# Patient Record
Sex: FEMALE | Race: WHITE | URBAN - METROPOLITAN AREA
[De-identification: names, ages, dates, MRNs, and addresses within clinical notes are randomized per-mention and may not be internally consistent; named-entity substitution may affect disease eponyms.]

---

## 2018-01-01 ENCOUNTER — APPOINTMENT (OUTPATIENT)
Dept: GENERAL RADIOLOGY | Facility: CLINIC | Age: 78
End: 2018-01-01
Attending: EMERGENCY MEDICINE
Payer: MEDICARE

## 2018-01-01 ENCOUNTER — NURSING HOME VISIT (OUTPATIENT)
Dept: GERIATRICS | Facility: CLINIC | Age: 78
End: 2018-01-01
Payer: MEDICARE

## 2018-01-01 ENCOUNTER — HOME CARE/HOSPICE - HEALTHEAST (OUTPATIENT)
Dept: HOSPICE | Facility: HOSPICE | Age: 78
End: 2018-01-01

## 2018-01-01 ENCOUNTER — RECORDS - HEALTHEAST (OUTPATIENT)
Dept: LAB | Facility: CLINIC | Age: 78
End: 2018-01-01

## 2018-01-01 ENCOUNTER — MEDICAL CORRESPONDENCE (OUTPATIENT)
Dept: HEALTH INFORMATION MANAGEMENT | Facility: CLINIC | Age: 78
End: 2018-01-01

## 2018-01-01 ENCOUNTER — HOSPITAL LABORATORY (OUTPATIENT)
Facility: OTHER | Age: 78
End: 2018-01-01

## 2018-01-01 ENCOUNTER — HOSPITAL ENCOUNTER (EMERGENCY)
Facility: CLINIC | Age: 78
Discharge: OTHER INSTITUTION | End: 2018-05-23
Attending: PHYSICIAN ASSISTANT | Admitting: PHYSICIAN ASSISTANT
Payer: MEDICARE

## 2018-01-01 ENCOUNTER — TELEPHONE (OUTPATIENT)
Dept: CARE COORDINATION | Facility: CLINIC | Age: 78
End: 2018-01-01

## 2018-01-01 VITALS
DIASTOLIC BLOOD PRESSURE: 86 MMHG | RESPIRATION RATE: 25 BRPM | TEMPERATURE: 98.3 F | HEART RATE: 133 BPM | OXYGEN SATURATION: 98 % | SYSTOLIC BLOOD PRESSURE: 169 MMHG

## 2018-01-01 VITALS
TEMPERATURE: 98.8 F | WEIGHT: 131.4 LBS | SYSTOLIC BLOOD PRESSURE: 167 MMHG | RESPIRATION RATE: 16 BRPM | DIASTOLIC BLOOD PRESSURE: 78 MMHG | OXYGEN SATURATION: 95 % | HEART RATE: 85 BPM

## 2018-01-01 VITALS
WEIGHT: 125 LBS | TEMPERATURE: 99.4 F | HEART RATE: 98 BPM | DIASTOLIC BLOOD PRESSURE: 67 MMHG | SYSTOLIC BLOOD PRESSURE: 137 MMHG | RESPIRATION RATE: 18 BRPM | OXYGEN SATURATION: 96 %

## 2018-01-01 VITALS
OXYGEN SATURATION: 96 % | RESPIRATION RATE: 16 BRPM | SYSTOLIC BLOOD PRESSURE: 129 MMHG | HEART RATE: 96 BPM | WEIGHT: 134.4 LBS | TEMPERATURE: 99.1 F | DIASTOLIC BLOOD PRESSURE: 53 MMHG

## 2018-01-01 DIAGNOSIS — I48.91 ATRIAL FIBRILLATION WITH RAPID VENTRICULAR RESPONSE (H): ICD-10-CM

## 2018-01-01 DIAGNOSIS — D64.9 ANEMIA, UNSPECIFIED TYPE: ICD-10-CM

## 2018-01-01 DIAGNOSIS — N18.6 END STAGE RENAL DISEASE (H): Primary | ICD-10-CM

## 2018-01-01 DIAGNOSIS — I48.91 ATRIAL FIBRILLATION WITH RAPID VENTRICULAR RESPONSE (H): Primary | ICD-10-CM

## 2018-01-01 DIAGNOSIS — I10 ESSENTIAL HYPERTENSION: ICD-10-CM

## 2018-01-01 DIAGNOSIS — I50.9 ACUTE ON CHRONIC CONGESTIVE HEART FAILURE, UNSPECIFIED CONGESTIVE HEART FAILURE TYPE: ICD-10-CM

## 2018-01-01 DIAGNOSIS — J18.9 COMMUNITY ACQUIRED PNEUMONIA OF LEFT LOWER LOBE OF LUNG: ICD-10-CM

## 2018-01-01 DIAGNOSIS — I50.9 CONGESTIVE HEART FAILURE, UNSPECIFIED CONGESTIVE HEART FAILURE CHRONICITY, UNSPECIFIED CONGESTIVE HEART FAILURE TYPE: ICD-10-CM

## 2018-01-01 DIAGNOSIS — N18.6 END STAGE RENAL DISEASE (H): ICD-10-CM

## 2018-01-01 DIAGNOSIS — F03.90 DEMENTIA WITHOUT BEHAVIORAL DISTURBANCE, UNSPECIFIED DEMENTIA TYPE: ICD-10-CM

## 2018-01-01 DIAGNOSIS — T74.91XD: ICD-10-CM

## 2018-01-01 DIAGNOSIS — R06.09 DYSPNEA ON EXERTION: ICD-10-CM

## 2018-01-01 DIAGNOSIS — D50.9 IRON DEFICIENCY ANEMIA, UNSPECIFIED IRON DEFICIENCY ANEMIA TYPE: ICD-10-CM

## 2018-01-01 DIAGNOSIS — I25.119 CORONARY ARTERY DISEASE INVOLVING NATIVE HEART WITH ANGINA PECTORIS, UNSPECIFIED VESSEL OR LESION TYPE (H): ICD-10-CM

## 2018-01-01 DIAGNOSIS — N18.30 CKD (CHRONIC KIDNEY DISEASE) STAGE 3, GFR 30-59 ML/MIN (H): ICD-10-CM

## 2018-01-01 DIAGNOSIS — D63.8 ANEMIA OF CHRONIC DISEASE: ICD-10-CM

## 2018-01-01 LAB
ABO + RH BLD: NORMAL
ABO + RH BLD: NORMAL
ALBUMIN SERPL-MCNC: 2.3 G/DL (ref 3.4–5)
ALBUMIN UR-MCNC: 30 MG/DL
ALP SERPL-CCNC: 87 U/L (ref 40–150)
ALT SERPL W P-5'-P-CCNC: 17 U/L (ref 0–50)
ANION GAP SERPL CALCULATED.3IONS-SCNC: 10 MMOL/L (ref 3–14)
ANION GAP SERPL CALCULATED.3IONS-SCNC: 7 MMOL/L (ref 3–14)
ANION GAP SERPL CALCULATED.3IONS-SCNC: 7 MMOL/L (ref 5–18)
ANION GAP SERPL CALCULATED.3IONS-SCNC: 8 MMOL/L (ref 3–14)
APPEARANCE UR: ABNORMAL
APTT PPP: 46 SEC (ref 22–37)
AST SERPL W P-5'-P-CCNC: 16 U/L (ref 0–45)
BACTERIA #/AREA URNS HPF: ABNORMAL /HPF
BASOPHILS # BLD AUTO: 0 10E9/L (ref 0–0.2)
BASOPHILS NFR BLD AUTO: 0.2 %
BILIRUB SERPL-MCNC: 0.3 MG/DL (ref 0.2–1.3)
BILIRUB UR QL STRIP: NEGATIVE
BLD GP AB SCN SERPL QL: NORMAL
BLD PROD TYP BPU: NORMAL
BLD PROD TYP BPU: NORMAL
BLD UNIT ID BPU: 0
BLOOD BANK CMNT PATIENT-IMP: NORMAL
BLOOD PRODUCT CODE: NORMAL
BPU ID: NORMAL
BUN SERPL-MCNC: 26 MG/DL (ref 8–28)
BUN SERPL-MCNC: 45 MG/DL (ref 7–30)
BUN SERPL-MCNC: 56 MG/DL (ref 7–30)
BUN SERPL-MCNC: 75 MG/DL (ref 7–30)
CALCIUM SERPL-MCNC: 8.6 MG/DL (ref 8.5–10.1)
CALCIUM SERPL-MCNC: 8.9 MG/DL (ref 8.5–10.1)
CALCIUM SERPL-MCNC: 9.3 MG/DL (ref 8.5–10.1)
CALCIUM SERPL-MCNC: 9.9 MG/DL (ref 8.5–10.5)
CHLORIDE BLD-SCNC: 108 MMOL/L (ref 98–107)
CHLORIDE SERPL-SCNC: 105 MMOL/L (ref 94–109)
CHLORIDE SERPL-SCNC: 105 MMOL/L (ref 94–109)
CHLORIDE SERPL-SCNC: 107 MMOL/L (ref 94–109)
CO2 SERPL-SCNC: 21 MMOL/L (ref 20–32)
CO2 SERPL-SCNC: 23 MMOL/L (ref 20–32)
CO2 SERPL-SCNC: 24 MMOL/L (ref 20–32)
CO2 SERPL-SCNC: 26 MMOL/L (ref 22–31)
COLOR UR AUTO: YELLOW
CREAT SERPL-MCNC: 1.62 MG/DL (ref 0.6–1.1)
CREAT SERPL-MCNC: 3.69 MG/DL (ref 0.52–1.04)
CREAT SERPL-MCNC: 4.73 MG/DL (ref 0.52–1.04)
CREAT SERPL-MCNC: 5.87 MG/DL (ref 0.52–1.04)
DIFFERENTIAL METHOD BLD: ABNORMAL
EOSINOPHIL # BLD AUTO: 0.4 10E9/L (ref 0–0.7)
EOSINOPHIL NFR BLD AUTO: 3.9 %
ERYTHROCYTE [DISTWIDTH] IN BLOOD BY AUTOMATED COUNT: 16.7 % (ref 11–14.5)
ERYTHROCYTE [DISTWIDTH] IN BLOOD BY AUTOMATED COUNT: 16.8 % (ref 10–15)
ERYTHROCYTE [DISTWIDTH] IN BLOOD BY AUTOMATED COUNT: 19.3 % (ref 10–15)
GFR SERPL CREATININE-BSD FRML MDRD: 12 ML/MIN/1.7M2
GFR SERPL CREATININE-BSD FRML MDRD: 31 ML/MIN/1.73M2
GFR SERPL CREATININE-BSD FRML MDRD: 7 ML/MIN/1.7M2
GFR SERPL CREATININE-BSD FRML MDRD: 9 ML/MIN/1.7M2
GLUCOSE BLD-MCNC: 88 MG/DL (ref 70–125)
GLUCOSE SERPL-MCNC: 80 MG/DL (ref 70–99)
GLUCOSE SERPL-MCNC: 92 MG/DL (ref 70–99)
GLUCOSE SERPL-MCNC: 92 MG/DL (ref 70–99)
GLUCOSE UR STRIP-MCNC: NEGATIVE MG/DL
HCT VFR BLD AUTO: 19.8 % (ref 35–47)
HCT VFR BLD AUTO: 20.9 % (ref 35–47)
HCT VFR BLD AUTO: 23.4 % (ref 35–47)
HCT VFR BLD AUTO: 28.5 % (ref 35–47)
HGB BLD-MCNC: 6.4 G/DL (ref 11.7–15.7)
HGB BLD-MCNC: 6.6 G/DL (ref 11.7–15.7)
HGB BLD-MCNC: 7.6 G/DL (ref 11.7–15.7)
HGB BLD-MCNC: 9.1 G/DL (ref 12–16)
HGB UR QL STRIP: NEGATIVE
IMM GRANULOCYTES # BLD: 0 10E9/L (ref 0–0.4)
IMM GRANULOCYTES NFR BLD: 0.2 %
INR PPP: 1.13 (ref 0.86–1.14)
KETONES UR STRIP-MCNC: NEGATIVE MG/DL
LACTATE BLD-SCNC: 0.8 MMOL/L (ref 0.7–2)
LEUKOCYTE ESTERASE UR QL STRIP: NEGATIVE
LIPASE SERPL-CCNC: 214 U/L (ref 73–393)
LYMPHOCYTES # BLD AUTO: 1 10E9/L (ref 0.8–5.3)
LYMPHOCYTES NFR BLD AUTO: 10.9 %
MAGNESIUM SERPL-MCNC: 2.2 MG/DL (ref 1.6–2.3)
MCH RBC QN AUTO: 26.7 PG (ref 26.5–33)
MCH RBC QN AUTO: 26.9 PG (ref 26.5–33)
MCH RBC QN AUTO: 30.8 PG (ref 27–34)
MCHC RBC AUTO-ENTMCNC: 31.6 G/DL (ref 31.5–36.5)
MCHC RBC AUTO-ENTMCNC: 31.9 G/DL (ref 32–36)
MCHC RBC AUTO-ENTMCNC: 32.5 G/DL (ref 31.5–36.5)
MCV RBC AUTO: 83 FL (ref 78–100)
MCV RBC AUTO: 85 FL (ref 78–100)
MCV RBC AUTO: 97 FL (ref 80–100)
MONOCYTES # BLD AUTO: 0.5 10E9/L (ref 0–1.3)
MONOCYTES NFR BLD AUTO: 5.4 %
NEUTROPHILS # BLD AUTO: 7.5 10E9/L (ref 1.6–8.3)
NEUTROPHILS NFR BLD AUTO: 79.4 %
NITRATE UR QL: NEGATIVE
NT-PROBNP SERPL-MCNC: ABNORMAL PG/ML (ref 0–1800)
NT-PROBNP SERPL-MCNC: ABNORMAL PG/ML (ref 0–450)
NUM BPU REQUESTED: 1
PH UR STRIP: 5 PH (ref 5–7)
PHOSPHATE SERPL-MCNC: 4.2 MG/DL (ref 2.5–4.5)
PLATELET # BLD AUTO: 246 THOU/UL (ref 140–440)
PLATELET # BLD AUTO: 360 10E9/L (ref 150–450)
PLATELET # BLD AUTO: 384 10E9/L (ref 150–450)
PMV BLD AUTO: 10.3 FL (ref 8.5–12.5)
POTASSIUM BLD-SCNC: 4.1 MMOL/L (ref 3.5–5)
POTASSIUM SERPL-SCNC: 4.3 MMOL/L (ref 3.4–5.3)
POTASSIUM SERPL-SCNC: 4.4 MMOL/L (ref 3.4–5.3)
POTASSIUM SERPL-SCNC: 5.6 MMOL/L (ref 3.4–5.3)
PROT SERPL-MCNC: 7.1 G/DL (ref 6.8–8.8)
PTH-INTACT SERPL-MCNC: 325 PG/ML (ref 18–80)
RBC # BLD AUTO: 2.47 10E12/L (ref 3.8–5.2)
RBC # BLD AUTO: 2.83 10E12/L (ref 3.8–5.2)
RBC # BLD AUTO: 2.95 MILL/UL (ref 3.8–5.4)
RBC #/AREA URNS AUTO: 2 /HPF (ref 0–2)
SODIUM SERPL-SCNC: 136 MMOL/L (ref 133–144)
SODIUM SERPL-SCNC: 136 MMOL/L (ref 133–144)
SODIUM SERPL-SCNC: 138 MMOL/L (ref 133–144)
SODIUM SERPL-SCNC: 141 MMOL/L (ref 136–145)
SOURCE: ABNORMAL
SP GR UR STRIP: 1.01 (ref 1–1.03)
SPECIMEN EXP DATE BLD: NORMAL
SQUAMOUS #/AREA URNS AUTO: 6 /HPF (ref 0–1)
TRANSFUSION STATUS PATIENT QL: NORMAL
TRANSFUSION STATUS PATIENT QL: NORMAL
TROPONIN I SERPL-MCNC: 0.03 UG/L (ref 0–0.04)
UROBILINOGEN UR STRIP-MCNC: 0 MG/DL (ref 0–2)
WBC # BLD AUTO: 11.4 10E9/L (ref 4–11)
WBC # BLD AUTO: 9.4 10E9/L (ref 4–11)
WBC #/AREA URNS AUTO: 2 /HPF (ref 0–5)
WBC: 9.9 THOU/UL (ref 4–11)

## 2018-01-01 PROCEDURE — 99310 SBSQ NF CARE HIGH MDM 45: CPT | Performed by: NURSE PRACTITIONER

## 2018-01-01 PROCEDURE — P9016 RBC LEUKOCYTES REDUCED: HCPCS | Performed by: EMERGENCY MEDICINE

## 2018-01-01 PROCEDURE — 36430 TRANSFUSION BLD/BLD COMPNT: CPT

## 2018-01-01 PROCEDURE — 83605 ASSAY OF LACTIC ACID: CPT | Performed by: EMERGENCY MEDICINE

## 2018-01-01 PROCEDURE — 25000128 H RX IP 250 OP 636: Performed by: EMERGENCY MEDICINE

## 2018-01-01 PROCEDURE — 25000125 ZZHC RX 250: Performed by: EMERGENCY MEDICINE

## 2018-01-01 PROCEDURE — 99306 1ST NF CARE HIGH MDM 50: CPT | Performed by: FAMILY MEDICINE

## 2018-01-01 PROCEDURE — 71045 X-RAY EXAM CHEST 1 VIEW: CPT

## 2018-01-01 PROCEDURE — 96374 THER/PROPH/DIAG INJ IV PUSH: CPT

## 2018-01-01 PROCEDURE — 85610 PROTHROMBIN TIME: CPT | Performed by: EMERGENCY MEDICINE

## 2018-01-01 PROCEDURE — 81001 URINALYSIS AUTO W/SCOPE: CPT | Performed by: EMERGENCY MEDICINE

## 2018-01-01 PROCEDURE — 93010 ELECTROCARDIOGRAM REPORT: CPT | Mod: Z6 | Performed by: EMERGENCY MEDICINE

## 2018-01-01 PROCEDURE — 84484 ASSAY OF TROPONIN QUANT: CPT | Performed by: EMERGENCY MEDICINE

## 2018-01-01 PROCEDURE — 86850 RBC ANTIBODY SCREEN: CPT | Performed by: EMERGENCY MEDICINE

## 2018-01-01 PROCEDURE — 93005 ELECTROCARDIOGRAM TRACING: CPT

## 2018-01-01 PROCEDURE — 85025 COMPLETE CBC W/AUTO DIFF WBC: CPT | Performed by: EMERGENCY MEDICINE

## 2018-01-01 PROCEDURE — 85730 THROMBOPLASTIN TIME PARTIAL: CPT | Performed by: EMERGENCY MEDICINE

## 2018-01-01 PROCEDURE — 86900 BLOOD TYPING SEROLOGIC ABO: CPT | Performed by: EMERGENCY MEDICINE

## 2018-01-01 PROCEDURE — 96375 TX/PRO/DX INJ NEW DRUG ADDON: CPT

## 2018-01-01 PROCEDURE — 83880 ASSAY OF NATRIURETIC PEPTIDE: CPT | Performed by: EMERGENCY MEDICINE

## 2018-01-01 PROCEDURE — 80053 COMPREHEN METABOLIC PANEL: CPT | Performed by: EMERGENCY MEDICINE

## 2018-01-01 PROCEDURE — 86901 BLOOD TYPING SEROLOGIC RH(D): CPT | Performed by: EMERGENCY MEDICINE

## 2018-01-01 PROCEDURE — 99285 EMERGENCY DEPT VISIT HI MDM: CPT | Mod: 25

## 2018-01-01 PROCEDURE — 83690 ASSAY OF LIPASE: CPT | Performed by: EMERGENCY MEDICINE

## 2018-01-01 PROCEDURE — 86923 COMPATIBILITY TEST ELECTRIC: CPT | Performed by: EMERGENCY MEDICINE

## 2018-01-01 PROCEDURE — 99285 EMERGENCY DEPT VISIT HI MDM: CPT | Mod: 25 | Performed by: EMERGENCY MEDICINE

## 2018-01-01 RX ORDER — METOPROLOL TARTRATE 1 MG/ML
5 INJECTION, SOLUTION INTRAVENOUS ONCE
Status: COMPLETED | OUTPATIENT
Start: 2018-01-01 | End: 2018-01-01

## 2018-01-01 RX ORDER — FERROUS SULFATE 325(65) MG
325 TABLET ORAL
COMMUNITY

## 2018-01-01 RX ORDER — DILTIAZEM HYDROCHLORIDE 5 MG/ML
10 INJECTION INTRAVENOUS ONCE
Status: COMPLETED | OUTPATIENT
Start: 2018-01-01 | End: 2018-01-01

## 2018-01-01 RX ADMIN — DILTIAZEM HYDROCHLORIDE 10 MG: 5 INJECTION INTRAVENOUS at 14:49

## 2018-01-01 RX ADMIN — SODIUM CHLORIDE 500 ML: 9 INJECTION, SOLUTION INTRAVENOUS at 14:50

## 2018-01-01 RX ADMIN — METOPROLOL TARTRATE 5 MG: 1 INJECTION, SOLUTION INTRAVENOUS at 13:28

## 2018-04-27 PROBLEM — I48.91 A-FIB (H): Status: ACTIVE | Noted: 2018-01-01

## 2018-04-27 PROBLEM — Z86.73 H/O TIA (TRANSIENT ISCHEMIC ATTACK) AND STROKE: Status: ACTIVE | Noted: 2018-01-01

## 2018-04-27 PROBLEM — I25.10 CAD (CORONARY ARTERY DISEASE): Status: ACTIVE | Noted: 2018-01-01

## 2018-04-27 PROBLEM — J45.909 ASTHMA: Status: ACTIVE | Noted: 2018-01-01

## 2018-04-27 PROBLEM — F03.90 DEMENTIA WITHOUT BEHAVIORAL DISTURBANCE (H): Status: ACTIVE | Noted: 2018-01-01

## 2018-04-27 PROBLEM — I50.9 CHF (CONGESTIVE HEART FAILURE) (H): Status: ACTIVE | Noted: 2018-01-01

## 2018-04-27 PROBLEM — I48.91 ATRIAL FIBRILLATION WITH RAPID VENTRICULAR RESPONSE (H): Status: ACTIVE | Noted: 2018-01-01

## 2018-04-27 PROBLEM — I10 HTN (HYPERTENSION): Status: ACTIVE | Noted: 2018-01-01
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
## 2018-05-17 PROBLEM — D63.8 ANEMIA IN OTHER CHRONIC DISEASES CLASSIFIED ELSEWHERE: Status: ACTIVE | Noted: 2018-01-01

## 2018-05-22 NOTE — PROGRESS NOTES
[place] [facility]    Chief Complaint   Patient presents with     Nursing Home Acute       HPI:    Doris Kam is a 78 year old  (1940), who is being seen today for an episodic care visit at [facility].    HPI information obtained from: facility chart records, facility staff, patient report and conf with guardianBibi. Today's concern is:  Anemia of chronic disease  Have been trying to arrange patient to go for blood transfusion x 4 days.Hgb 6.4, HR elevated, patient fatigued, unable to participate in therapies, sleeping most of day.  Denies SOB or chest pain, however, is poor historian due to dementia.  Due to complications with getting signatures, guardian approval, transportation and someone to accompany her for outpatient transfusion, this has not occurred.    End stage renal disease (H)  Patient is making urine, however, kidney function labs worsening    Urea Nitrogen 56 (H) 7 - 30 mg/dL Final 05/17/2018  6:28 AM 59   Creatinine 4.73 (H) 0.52 - 1.04 mg/dL Final 05/17/2018  6:28 AM 59   GFR Estimate 9 (L) >60 mL/min/1.7m2 Final 05/17/2018  6:28 AM 59   Comment:   Non  GFR Calc   GFR Estimate If Black 11 (L) >60 mL/min/1.7m2 Final 05/17/2018  6:28 AM 59   Comment:     Component Value Flag Ref Range Units Status Collected Lab   N-Terminal Pro Bnp 49492 (H) 0 - 450 pg/mL Final 05/17/2018  6:28 AM 59       REVIEW OF SYSTEMS:  Unobtainable secondary to cognitive impairment.     /78  Pulse 85  Temp 98.8  F (37.1  C)  Resp 16  Wt 131 lb 6.4 oz (59.6 kg)  SpO2 95%  GENERAL APPEARANCE:  Alert, in no distress  Lying in bed, resting  Lungs clear, HRR, conjunctiva pale    ASSESSMENT/PLAN:  Anemia of chronic disease  End stage renal disease (H)  Patient very anemic, needing blood transfusion,and  correction of creatinine  Phone conf with Bibi, carloan. Discussed concerns, options of hospitalization vs hospice.  Recommend hospitalization for attempt to correct and then  opportunity for ongoing conversations of goals of care. Bibi and Doris in agreement of hospitalization for above.  [place] transport [place] [facility]      Total time spent with patient visit at the skilled nursing facility was 35 min including patient visit, review of past records and phone call to patient contact. Greater than 50% of total time spent with counseling and coordinating care due to coordinating care, decision making re:  hospitalization vs hospice,arrange for transport to hospital    Electronically signed by:  DANGELO Simon CNP

## 2018-05-22 NOTE — LETTER
5/22/2018        RE: Doris Kam  [address]  [facility] [address]        [place] [facility]    Chief Complaint   Patient presents with     Nursing Home Acute       HPI:    Doris Kam is a 78 year old  (1940), who is being seen today for an episodic care visit at [facility].    HPI information obtained from: facility chart records, facility staff, patient report and conf with carloanBibi. Today's concern is:  Anemia of chronic disease  Have been trying to arrange patient to go for blood transfusion x 4 days.Hgb 6.4, HR elevated, patient fatigued, unable to participate in therapies, sleeping most of day.  Denies SOB or chest pain, however, is poor historian due to dementia.  Due to complications with getting signatures, guardian approval, transportation and someone to accompany her for outpatient transfusion, this has not occurred.    End stage renal disease (H)  Patient is making urine, however, kidney function labs worsening    Urea Nitrogen 56 (H) 7 - 30 mg/dL Final 05/17/2018  6:28 AM 59   Creatinine 4.73 (H) 0.52 - 1.04 mg/dL Final 05/17/2018  6:28 AM 59   GFR Estimate 9 (L) >60 mL/min/1.7m2 Final 05/17/2018  6:28 AM 59   Comment:   Non  GFR Calc   GFR Estimate If Black 11 (L) >60 mL/min/1.7m2 Final 05/17/2018  6:28 AM 59   Comment:     Component Value Flag Ref Range Units Status Collected Lab   N-Terminal Pro Bnp 79322 (H) 0 - 450 pg/mL Final 05/17/2018  6:28 AM 59       REVIEW OF SYSTEMS:  Unobtainable secondary to cognitive impairment.     /78  Pulse 85  Temp 98.8  F (37.1  C)  Resp 16  Wt 131 lb 6.4 oz (59.6 kg)  SpO2 95%  GENERAL APPEARANCE:  Alert, in no distress  Lying in bed, resting  Lungs clear, HRR, conjunctiva pale    ASSESSMENT/PLAN:  Anemia of chronic disease  End stage renal disease (H)  Patient very anemic, needing blood transfusion,and  correction of creatinine  Phone conf with samara Mtz. Discussed concerns, options of  hospitalization vs hospice.  Recommend hospitalization for attempt to correct and then opportunity for ongoing conversations of goals of care. Bibi and Doris in agreement of hospitalization for above.  [place] [facility] [place]      Total time spent with patient visit at the skilled nursing facility was 35 min including patient visit, review of past records and phone call to patient contact. Greater than 50% of total time spent with counseling and coordinating care due to coordinating care, decision making re:  hospitalization vs hospice,arrange for transport to hospital    Electronically signed by:  DANGELO Simon CNP      Sincerely,        DANGELO Simon CNP

## 2018-05-23 NOTE — ED PROVIDER NOTES
History     Chief Complaint   Patient presents with     Fatigue     here for blood transfusion     HPI  Doris Kam is a 78 year old female with a history of hypertension, congestive heart failure, atrial fibrillation, anemia, and asthma who presents to the emergency department via EMS for evaluation of fatigue.  History is limited due to mental status of patient.  [facility] in [place], [place].  She was sent to the emergency department for a possible transfusion as her blood work was abnormal this morning.  She is reported to have anemia and is in renal failure by the paperwork sent with her from the nursing facility.  Staff is asking for her to be admitted and treated inpatient.  Patient recently had been admitted to [facility] for A. fib with RVR and renal insufficiency along with possible pneumonia.  She was discharged on April 25 to a nursing home and has been there in the memory care unit since that time.  She was noted to have a low hemoglobin and elevated creatinine 4 days ago and arrangements were made to give the patient a transfusion but they are unable to transport the patient for several days and finally sent her for further evaluation and care.    Patient states her head is sore but she denies generalized pain and shortness of breath.    11:45 AM     Problem List:    Patient Active Problem List    Diagnosis Date Noted     Anemia in other chronic diseases classified elsewhere 05/17/2018     Priority: Medium     Dementia without behavioral disturbance 04/27/2018     Priority: Medium     HTN (hypertension) 04/27/2018     Priority: Medium     Asthma 04/27/2018     Priority: Medium     CAD (coronary artery disease) 04/27/2018     Priority: Medium     H/O TIA (transient ischemic attack) and stroke 04/27/2018     Priority: Medium     A-fib (H) 04/27/2018     Priority: Medium     CHF (congestive heart failure) (H) 04/27/2018     Priority: Medium     Atrial fibrillation with  rapid ventricular response (H) 04/27/2018     Priority: Medium        Past Medical History:    Past Medical History:   Diagnosis Date     Asthma      CAD (coronary artery disease)      CHF (congestive heart failure) (H)      CKD (chronic kidney disease) stage 3, GFR 30-59 ml/min      HTN (hypertension)      Osteoporosis      TIA (transient ischemic attack)        Past Surgical History:    No past surgical history on file.    Family History:    No family history on file.    Social History:  Marital Status:  Single [1]  Social History   Substance Use Topics     Smoking status: Not on file     Smokeless tobacco: Not on file     Alcohol use Not on file        Medications:      ACETAMINOPHEN PO   ATORVASTATIN CALCIUM PO   ferrous sulfate (IRON) 325 (65 Fe) MG tablet   Furosemide (LASIX PO)   POTASSIUM CHLORIDE PO         Review of Systems   Unable to perform ROS: Dementia       Physical Exam   BP: (!) 172/143  Pulse: 133  Temp: 97.9  F (36.6  C)  Resp: 20  SpO2: 99 %      Physical Exam   Constitutional:   Frail elderly female in no acute distress.   Eyes: Conjunctivae and EOM are normal.   Psychiatric:   Flat affect   Nursing note and vitals reviewed.    HENT: Oral mucosa moist. No lesions.  Neck: Supple  Pulmonary/Chest: Lungs have crackles at bases.  Cardiovascular: Heart is tachycardic with irregularly irregular rhythm. No murmur.  Abdomen: Soft, non-distended, non-tender.   Musculoskeletal: Moving all extremities well. Positive 2+ pitting edema in lower extremities  Neurological: Alert to person and place  Skin: No rash. Slightly pale  ED Course     ED Course     Procedures               EKG Interpretation:      Interpreted by Jose Leon  Rhythm: atrial fibrillation - rapid  Rate: 130-140  Axis: Normal  Ectopy: none  Conduction: normal  ST Segments/ T Waves: Non-specific ST-T wave changes  Q Waves: none  Comparison to prior: No old EKG for comparison    Clinical Impression: atrial fibrillation (new onset)  started 1 month ago                Critical Care time:  was 40 minutes for this patient excluding procedures.               Results for orders placed or performed during the hospital encounter of 05/23/18 (from the past 24 hour(s))   CBC with platelets, differential   Result Value Ref Range    WBC 9.4 4.0 - 11.0 10e9/L    RBC Count 2.47 (L) 3.8 - 5.2 10e12/L    Hemoglobin 6.6 (LL) 11.7 - 15.7 g/dL    Hematocrit 20.9 (L) 35.0 - 47.0 %    MCV 85 78 - 100 fl    MCH 26.7 26.5 - 33.0 pg    MCHC 31.6 31.5 - 36.5 g/dL    RDW 19.3 (H) 10.0 - 15.0 %    Platelet Count 360 150 - 450 10e9/L    Diff Method Automated Method     % Neutrophils 79.4 %    % Lymphocytes 10.9 %    % Monocytes 5.4 %    % Eosinophils 3.9 %    % Basophils 0.2 %    % Immature Granulocytes 0.2 %    Absolute Neutrophil 7.5 1.6 - 8.3 10e9/L    Absolute Lymphocytes 1.0 0.8 - 5.3 10e9/L    Absolute Monocytes 0.5 0.0 - 1.3 10e9/L    Absolute Eosinophils 0.4 0.0 - 0.7 10e9/L    Absolute Basophils 0.0 0.0 - 0.2 10e9/L    Abs Immature Granulocytes 0.0 0 - 0.4 10e9/L   Comprehensive metabolic panel   Result Value Ref Range    Sodium 136 133 - 144 mmol/L    Potassium 5.6 (H) 3.4 - 5.3 mmol/L    Chloride 105 94 - 109 mmol/L    Carbon Dioxide 21 20 - 32 mmol/L    Anion Gap 10 3 - 14 mmol/L    Glucose 92 70 - 99 mg/dL    Urea Nitrogen 75 (H) 7 - 30 mg/dL    Creatinine 5.87 (H) 0.52 - 1.04 mg/dL    GFR Estimate 7 (L) >60 mL/min/1.7m2    GFR Estimate If Black 8 (L) >60 mL/min/1.7m2    Calcium 8.9 8.5 - 10.1 mg/dL    Bilirubin Total 0.3 0.2 - 1.3 mg/dL    Albumin 2.3 (L) 3.4 - 5.0 g/dL    Protein Total 7.1 6.8 - 8.8 g/dL    Alkaline Phosphatase 87 40 - 150 U/L    ALT 17 0 - 50 U/L    AST 16 0 - 45 U/L   Lipase   Result Value Ref Range    Lipase 214 73 - 393 U/L   Troponin I   Result Value Ref Range    Troponin I ES 0.027 0.000 - 0.045 ug/L   INR   Result Value Ref Range    INR 1.13 0.86 - 1.14   Lactic acid whole blood   Result Value Ref Range    Lactic Acid 0.8 0.7 - 2.0  mmol/L   NT pro BNP   Result Value Ref Range    N-Terminal Pro BNP Inpatient 84018 (H) 0 - 1800 pg/mL   ABO/Rh type and screen   Result Value Ref Range    Units Ordered 1     ABO O     RH(D) Pos     Antibody Screen Neg     Test Valid Only At [facility]        Specimen Expires 05/26/2018     Crossmatch Red Blood Cells    Partial thromboplastin time   Result Value Ref Range    PTT 46 (H) 22 - 37 sec   Blood component   Result Value Ref Range    Unit Number [id number]     Blood Component Type Red Blood Cells Leukocyte Reduced     Division Number 00     Status of Unit Released to care unit 05/23/2018 1622     Blood Product Code [id number]     Unit Status ISS    UA reflex to Microscopic   Result Value Ref Range    Color Urine Yellow     Appearance Urine Cloudy     Glucose Urine Negative NEG^Negative mg/dL    Bilirubin Urine Negative NEG^Negative    Ketones Urine Negative NEG^Negative mg/dL    Specific Gravity Urine 1.012 1.003 - 1.035    Blood Urine Negative NEG^Negative    pH Urine 5.0 5.0 - 7.0 pH    Protein Albumin Urine 30 (A) NEG^Negative mg/dL    Urobilinogen mg/dL 0.0 0.0 - 2.0 mg/dL    Nitrite Urine Negative NEG^Negative    Leukocyte Esterase Urine Negative NEG^Negative    Source Catheterized Urine     RBC Urine 2 0 - 2 /HPF    WBC Urine 2 0 - 5 /HPF    Bacteria Urine Few (A) NEG^Negative /HPF    Squamous Epithelial /HPF Urine 6 (H) 0 - 1 /HPF   Chest  XR, 1 view portable    Narrative    XR CHEST PORT 1 VW 5/23/2018 2:18 PM    HISTORY: Shortness of breath.    COMPARISON: None.      Impression    IMPRESSION: Diffuse bilateral pulmonary opacities suggestive of edema.  Recommend clinical correlation. Possible small effusions. No  pneumothorax. There is cardiomegaly.    SHARMIN WAGGONER MD       Medications - No data to display  11:45 AM Patient assessed  Assessments & Plan (with Medical Decision Making) records from recent admission were reviewed. Patient is DNR with selective care. Due to patient's  tachycardia I did give a bolus of IV fluids.  EKG revealed atrial fibrillation with rapid ventricular rate.  Patient had received a dose of metoprolol while in the hospital last time with improvement and therefore I did try and a dose of IV metoprolol.  White count was 9.4 hemoglobin was very low at 6.6.  Platelet count was 360.  Comprehensive metabolic panel significant for potassium slightly elevated 5.6 BUN was 75 and creatinine was 5.87.  Lipase was 214 and troponin was within normal limits.  Lactic acid was within normal limits.  Patient was typed and screened.  Patient's BNP was elevated at 56,000.  Chest x-ray with diffuse bilateral pulmonary opacities suggestive of edema.  There is cardiomegaly.  Patient continued with A. fib and RVR and was given a dose of diltiazem and eventually converted to a regular rate and rhythm..  Due to a renal failure the patient will need transfer and further evaluation where nephrology is present.  Since she had been at [facility] a call was made there but they were no beds available there there were beds available at [facility] and I initially talked with the intensivist Dr. Rivera who felt since patient had converted and is having stable vital signs he felt talking with the hospitalist would be warranted in putting the patient on a telemetry bed.  We discussed possible transfusion we will start a unit slowly.  I discussed the case with Dr. Wilde hospitalists at [facility] and he is in agreement with transfer.  Discussed findings with patient tried to explain what was going on at all times.  She does appear confused.  I made a call to her designated caregiver but was unable to reach them.     I have reviewed the nursing notes.    I have reviewed the findings, diagnosis, plan and need for follow up with the patient.       New Prescriptions    No medications on file       Final diagnoses:   Atrial fibrillation with rapid ventricular response (H)   Dyspnea on exertion   Anemia,  unspecified type   Acute on chronic congestive heart failure, unspecified congestive heart failure type (H)     This document serves as a record of the services and decisions personally performed and made by Jose Leon MD. It was created on HIS/HER behalf by Marietta Solitario, a trained medical scribe. The creation of this document is based the provider's statements to the medical scribe.  Marietta Solitario 12:01 PM 5/23/2018    Provider:   The information in this document, created by the medical scribe for me, accurately reflects the services I personally performed and the decisions made by me. I have reviewed and approved this document for accuracy prior to leaving the patient care area.  Jose Leon MD 12:01 PM 5/23/2018 5/23/2018   [facility] EMERGENCY DEPARTMENT     Jose Leon MD  05/25/18 0947

## 2020-12-18 NOTE — ED NOTES
DATE:  5/23/2018   TIME OF RECEIPT FROM LAB:  1231  LAB TEST:  hgb  LAB VALUE:  6.6  RESULTS GIVEN WITH READ-BACK TO (PROVIDER):  Jose Leon MD  TIME LAB VALUE REPORTED TO PROVIDER:   1230   no